# Patient Record
Sex: FEMALE | Race: WHITE | URBAN - METROPOLITAN AREA
[De-identification: names, ages, dates, MRNs, and addresses within clinical notes are randomized per-mention and may not be internally consistent; named-entity substitution may affect disease eponyms.]

---

## 2022-02-25 ENCOUNTER — OFFICE VISIT (OUTPATIENT)
Dept: URBAN - METROPOLITAN AREA CLINIC 28 | Facility: CLINIC | Age: 67
End: 2022-02-25
Payer: COMMERCIAL

## 2022-02-25 DIAGNOSIS — H02.054 TRICHIASIS WITHOUT ENTROPION LEFT UPPER EYELID: ICD-10-CM

## 2022-02-25 PROCEDURE — 92004 COMPRE OPH EXAM NEW PT 1/>: CPT | Performed by: OPTOMETRIST

## 2022-02-25 PROCEDURE — 67820 REVISE EYELASHES: CPT | Performed by: OPTOMETRIST

## 2022-02-25 ASSESSMENT — INTRAOCULAR PRESSURE
OS: 11
OD: 13

## 2022-02-25 NOTE — IMPRESSION/PLAN
Impression: Vitreous detachment of right eye: H43.811. Plan: Educated on exam findings and condition. Educated on impact of trauma on PVD formation. Educated on s/s of retinal detachment including flashes, floaters, or curtain over vision and to return immediately if experienced. Otherwise, monitor with repeat dilation in 6 weeks.

## 2022-02-25 NOTE — IMPRESSION/PLAN
Impression: Trichiasis without entropion left upper eyelid: H02.054. Plan: Educated on exam findings. Removed 1 lash from BRIAN today. Monitor.

## 2022-03-03 ENCOUNTER — OFFICE VISIT (OUTPATIENT)
Dept: URBAN - METROPOLITAN AREA CLINIC 23 | Facility: CLINIC | Age: 67
End: 2022-03-03
Payer: COMMERCIAL

## 2022-03-03 DIAGNOSIS — H43.811 VITREOUS DEGENERATION, RIGHT EYE: Primary | ICD-10-CM

## 2022-03-03 PROCEDURE — 99203 OFFICE O/P NEW LOW 30 MIN: CPT | Performed by: OPTOMETRIST

## 2022-03-03 ASSESSMENT — INTRAOCULAR PRESSURE
OS: 13
OD: 14

## 2022-03-03 NOTE — IMPRESSION/PLAN
Impression: Vitreous degeneration, right eye: H43.811 Right. Condition: will continue to monitor. Plan: Discussed findings. OPTOS photos ordered and reviewed. PVD accounts for patient's complaints, no retinal pathology noted today. Signs and symptoms of retinal tear/detachment discussed, patient to call immediately with any noted symptoms. Recommend monitoring in one month if symptomatic. Gave handout on PVD.

## 2022-04-05 ENCOUNTER — OFFICE VISIT (OUTPATIENT)
Dept: URBAN - METROPOLITAN AREA CLINIC 23 | Facility: CLINIC | Age: 67
End: 2022-04-05
Payer: COMMERCIAL

## 2022-04-05 PROCEDURE — 99213 OFFICE O/P EST LOW 20 MIN: CPT | Performed by: OPTOMETRIST

## 2022-04-05 ASSESSMENT — KERATOMETRY
OD: 45.75
OS: 46.25

## 2022-04-05 ASSESSMENT — INTRAOCULAR PRESSURE
OD: 13
OS: 12

## 2022-04-05 NOTE — IMPRESSION/PLAN
Impression: Vitreous degeneration, right eye: H43.811 Right. Condition: established, stable. Plan: Discussed findings, OPTOS photos ordered and reviewed. No retinal pathology noted today, PVD stable. Continue to monitor, call with any vision changes. Briefly discussed option for PPVx if floater continues to be bothersome, patient will call to make consult PRN.

## 2023-04-06 ENCOUNTER — OFFICE VISIT (OUTPATIENT)
Dept: URBAN - METROPOLITAN AREA CLINIC 23 | Facility: CLINIC | Age: 68
End: 2023-04-06
Payer: COMMERCIAL

## 2023-04-06 DIAGNOSIS — H43.811 VITREOUS DEGENERATION, RIGHT EYE: Primary | ICD-10-CM

## 2023-04-06 DIAGNOSIS — H25.813 COMBINED FORMS OF AGE-RELATED CATARACT, BILATERAL: ICD-10-CM

## 2023-04-06 PROCEDURE — 99213 OFFICE O/P EST LOW 20 MIN: CPT | Performed by: OPTOMETRIST

## 2023-04-06 ASSESSMENT — KERATOMETRY
OS: 46.88
OD: 46.00

## 2023-04-06 ASSESSMENT — INTRAOCULAR PRESSURE
OS: 12
OD: 12

## 2023-04-06 NOTE — IMPRESSION/PLAN
Impression: Vitreous degeneration, right eye: H43.811 Right. Condition: established, stable. Plan: Discussed findings. Stable PVD, no further retinal pathology noted. Monitor annually, sooner with any vision changes.

## 2023-04-06 NOTE — IMPRESSION/PLAN
Impression: Combined forms of age-related cataract, bilateral: H25.813. Bilateral. Condition: mild. Plan: Discussed findings. Mild cataracts, no treatment necessary. Monitor annually, sooner with any vision changes.